# Patient Record
Sex: FEMALE | Race: WHITE | NOT HISPANIC OR LATINO | URBAN - METROPOLITAN AREA
[De-identification: names, ages, dates, MRNs, and addresses within clinical notes are randomized per-mention and may not be internally consistent; named-entity substitution may affect disease eponyms.]

---

## 2023-10-03 ENCOUNTER — OFFICE VISIT (OUTPATIENT)
Dept: UROLOGY | Facility: CLINIC | Age: 53
End: 2023-10-03
Payer: COMMERCIAL

## 2023-10-03 VITALS
HEART RATE: 71 BPM | DIASTOLIC BLOOD PRESSURE: 76 MMHG | BODY MASS INDEX: 24.01 KG/M2 | WEIGHT: 144.3 LBS | SYSTOLIC BLOOD PRESSURE: 120 MMHG

## 2023-10-03 DIAGNOSIS — N81.89 PELVIC FLOOR WEAKNESS IN FEMALE: Primary | ICD-10-CM

## 2023-10-03 DIAGNOSIS — N81.4 UTEROVAGINAL PROLAPSE: ICD-10-CM

## 2023-10-03 LAB
POC APPEARANCE, URINE: CLEAR
POC BILIRUBIN, URINE: NEGATIVE
POC BLOOD, URINE: ABNORMAL
POC COLOR, URINE: YELLOW
POC GLUCOSE, URINE: NEGATIVE MG/DL
POC KETONES, URINE: NEGATIVE MG/DL
POC LEUKOCYTES, URINE: NEGATIVE
POC NITRITE,URINE: NEGATIVE
POC PH, URINE: 7 PH
POC PROTEIN, URINE: NEGATIVE MG/DL
POC SPECIFIC GRAVITY, URINE: 1.01
POC UROBILINOGEN, URINE: 0.2 EU/DL

## 2023-10-03 PROCEDURE — 99244 OFF/OP CNSLTJ NEW/EST MOD 40: CPT | Performed by: OBSTETRICS & GYNECOLOGY

## 2023-10-03 PROCEDURE — 81003 URINALYSIS AUTO W/O SCOPE: CPT | Mod: QW | Performed by: OBSTETRICS & GYNECOLOGY

## 2023-10-03 PROCEDURE — 1036F TOBACCO NON-USER: CPT | Performed by: OBSTETRICS & GYNECOLOGY

## 2023-10-03 PROCEDURE — 87086 URINE CULTURE/COLONY COUNT: CPT | Performed by: OBSTETRICS & GYNECOLOGY

## 2023-10-03 ASSESSMENT — ENCOUNTER SYMPTOMS
DEPRESSION: 0
OCCASIONAL FEELINGS OF UNSTEADINESS: 0
LOSS OF SENSATION IN FEET: 0

## 2023-10-03 ASSESSMENT — PATIENT HEALTH QUESTIONNAIRE - PHQ9
2. FEELING DOWN, DEPRESSED OR HOPELESS: NOT AT ALL
SUM OF ALL RESPONSES TO PHQ9 QUESTIONS 1 AND 2: 0
1. LITTLE INTEREST OR PLEASURE IN DOING THINGS: NOT AT ALL

## 2023-10-03 ASSESSMENT — PAIN SCALES - GENERAL: PAINLEVEL: 0-NO PAIN

## 2023-10-03 NOTE — PROGRESS NOTES
Subjective   Patient ID: Anastasiya Wheat is a 53 y.o. female who presents for vaginal bulge  HPI    53- year-old patient presenting as a referral from Dr. Pollard  with complaints of vaginal bulge.     The patient presents with complaints of noting a vaginal bulge for the past few weeks to months. , she notes a pressure and pulling sensation with it. She is not sexually active and denies any vaginal complaints, no abnormal vaginal bleeding or discharge. She denies any vaginal dryness or irritation. Her last pap smear was in 2019.  She has been amenorrheic for the last year.    She denies any significant lower urinary tract complaints. She denies any episodes of nocturia or enuresis. She voids every hour during the day. She denies leaking on laughing, cough or sneezing. She denies any history of nephrolithiasis, gross hematuria or chronic recurrent UTIs.     She denies any bowel related complaints, no fecal or flatal incontinence.    She has no other complaints.    Review of Systems  Constitutional: No fever, No chills and No fatigue.   Eyes: No vision problems and No dryness of the eyes.   ENT: No dry mouth, No hearing loss and No nosebleeds.   Cardiovascular: No chest pain, No palpitations and No orthopnea.   Respiratory: No shortness of breath, No cough and No wheezing.   Gastrointestinal: No abdominal pain, No constipation, No nausea, No diarrhea, No vomiting and No melena.   Genitourinary: As noted in HPI.   Musculoskeletal: No back pain, No myalgias, No muscle weakness, No joint swelling and No leg edema.   Integumentary: No rashes, No skin lesion and No itching.   Neurological: No headache, No numbness and No dizziness.   Psychiatric: No sleep disturbances, No anxiety and No depression.   Endocrine: No hot flashes, No loss of hair and No hirsutism.   Hematologic/Lymphatic: No swollen glands, No tendency for easy bleeding and No tendency for easy bruising.   All other systems have been reviewed and are  negative for complaint.        Objective   Physical Exam  Constitutional: alert and in no acute distress, well developed, well nourished.   Head and Face: head and face: unremarkable.   Eyes, Ears, Nose, Mouth, and Throat: unremarkable external exam - nonicteric sclera, extraocular movements intact (EOMI) and no ptosis, unremarkable external inspection of ears and nose.   Neck: no neck asymmetry, supple, thyroid not enlarged and there were no palpable thyroid nodules.   Cardiovascular: unremarkable distal pulses.   Pulmonary: no respiratory distress, unremarkable respiratory effort., clear bilateral breath sounds.   Abdomen: soft nontender; no abdominal mass palpated, no organomegaly and no hernias.  There is no tenderness to palpation in the bilateral inguinal area.  Musculoskeletal: no joint swelling seen, normal movements of all extremities.   Skin: normal skin color and pigmentation, normal skin turgor, and no rash.   Neurologic: cranial nerves: non-focal, grossly intact.   Lymphatic: no cervical lymphadenopathy, palpation of lymph nodes in other areas: normal.   Psychiatric: alert and oriented x 3, affect normal to patient baseline, mood: appropriate and judgment and insight: intact.      Sexual Maturity: Normal  External Genitalia: No vulvar atrophy and no vulvitis  Inguinal Nodes:  No inguinal lymph adenopathy  Vestibule: No bartholin's cyst and no [place]'s gland cyst  Urethra: hypermobile, nut no ureterocele, No urethral caruncle and No tenderness.  Negative CST when sitting.   Bladder: No tenderness, No distension  Vagina: rectocele -1, cystocele -1  Cervix: no lesions   Uterus: normal size, mobile, and nontender  Adnexa: Unremarkable on palpation.  Pelvic Floor/Tenderness: KEGEL(Out of 5): 4 highest pain level is 0 (0-10)  POP-Q: Stage:  and patient was sitting. Aa.  -1 Ba: -1 C: -6 Gh:.  4 Pb:.  3 TVL: 9 Ap: -1 Bp: -1 D: -8  Rectum: Rectocele Yes -distal. The sphincter tone Normal.  Anus: There were no  anal fissures. There is no hemorrhoids.  Perianal area: Unremarkable.   Assessment/Plan   53-year-old with stage I anterior and posterior wall prolapse with pelvic floor weakness.    #1 we discussed the patient's prolapse complaints.  She denies any lower urinary tract symptoms.  She is presently not sexually active.  But she does note her bulge when showering.  We discussed the treatment algorithm including expectant management, pessary management, pelvic floor physical therapy, and surgical options.  The patient wishes to proceed with pelvic floor physical therapy and was provided a referral as well as a list of providers.    2.  Patient is otherwise well estrogenized and denies any lower urinary tract or defecatory complaints.    3.  The patient will follow-up in 8 weeks to discuss her pelvic complaints.  She does have a history of inguinal hernia repair although this is not evident on exam today.    ZACHARY Stephenson MD     Scribe Attestation  By signing my name below, IConstance Scribe attest that this documentation has been prepared under the direction and in the presence of Pete Stephenson MD. All medical record entries made by the Scribe were at my direction or personally dictated by me. I have reviewed the chart and agree that the record accurately reflects my personal performance of the history, physical exam, discussion and plan.

## 2023-10-06 LAB — BACTERIA UR CULT: NO GROWTH

## 2023-11-14 ENCOUNTER — TREATMENT (OUTPATIENT)
Dept: PHYSICAL THERAPY | Facility: CLINIC | Age: 53
End: 2023-11-14
Payer: COMMERCIAL

## 2023-11-14 DIAGNOSIS — N81.4 UTEROVAGINAL PROLAPSE: ICD-10-CM

## 2023-11-14 DIAGNOSIS — N81.89 PELVIC FLOOR WEAKNESS IN FEMALE: ICD-10-CM

## 2023-11-14 PROCEDURE — 97535 SELF CARE MNGMENT TRAINING: CPT | Mod: GP

## 2023-11-14 PROCEDURE — 97110 THERAPEUTIC EXERCISES: CPT | Mod: GP

## 2023-11-14 PROCEDURE — 97161 PT EVAL LOW COMPLEX 20 MIN: CPT | Mod: GP

## 2023-11-14 ASSESSMENT — PAIN - FUNCTIONAL ASSESSMENT: PAIN_FUNCTIONAL_ASSESSMENT: 0-10

## 2023-11-14 ASSESSMENT — ENCOUNTER SYMPTOMS
DEPRESSION: 0
LOSS OF SENSATION IN FEET: 0
OCCASIONAL FEELINGS OF UNSTEADINESS: 0

## 2023-11-14 ASSESSMENT — PAIN SCALES - GENERAL: PAINLEVEL_OUTOF10: 0 - NO PAIN

## 2023-11-14 ASSESSMENT — PATIENT HEALTH QUESTIONNAIRE - PHQ9
1. LITTLE INTEREST OR PLEASURE IN DOING THINGS: NOT AT ALL
2. FEELING DOWN, DEPRESSED OR HOPELESS: NOT AT ALL
SUM OF ALL RESPONSES TO PHQ9 QUESTIONS 1 AND 2: 0

## 2023-11-14 NOTE — PROGRESS NOTES
Physical Therapy    PELVIC FLOOR    Name: Anastasiya Wheat  MRN: [id number]  : 1970  Today's Date: 23     Time Calculation  Start Time: 1004  Stop Time: 1056  Time Calculation (min): 52 min    Assessment:     Pt presenting to the clinic with intermittent vaginal bulge and pressure that is noticeable during standing with one leg propped and long distance running. The pressure is not consistent but patient is here as a preventive measure. She is wanting information and exercises to reduce prolapse pressure and prevent worsening of symptoms. Upon exam, pt has some descent with increased abdominal pressure but appropriate mobility of her pelvic floor. She could benefit from continued PT to get on a home program for management of symptoms.     Plan:     Will follow up with 1 appointment in 2-3 weeks to review exercises and build a formal program.   PT Plan: Skilled PT  PT Frequency: Follow-up visit only  Number of Treatments Authorized: 100  Rehab Potential: Excellent  Plan of Care Agreement: Patient    Planned interventions include: biofeedback, cryotherapy, education/instruction, electrical stimulation, gait training, home program, hot pack, kinesiotaping, manual therapy, neuromuscular re-education, self care/home management, therapeutic activities, and therapeutic exercises.     Interventions:     Access Code: [email or web address]  URL: [email or web address]  Date: 2023  Prepared by: Sonal Rivero    Exercises  - Clamshell  - 1 x daily - 7 x weekly - 2 sets - 10 reps - 5 seconds  hold  - Sidelying Reverse Clamshell  - 1 x daily - 7 x weekly - 2 sets - 10 reps - 5 seconds  hold  - Bird Dog  - 1 x daily - 7 x weekly - 2 sets - 10 reps - 5 seconds  hold  - Quadruped Alternating Arm Lift  - 1 x daily - 7 x weekly - 2 sets - 10 reps - 5 seconds  hold    Current Problem:  1. Uterovaginal prolapse  Referral to Physical Therapy    Follow Up In Physical Therapy      2. Pelvic floor weakness in  female  Referral to Physical Therapy    Follow Up In Physical Therapy          Subjective   General  Reason for Referral: Uterine prolapse  Referred By: Dr. Stephenson  Stage 1-2 vaginal prolapse.   Noticeable in the shower, feels like a tampon is falling out during running (inconsistently).   bothered by the rubbing.    Running 4 days per week, 15 miles per week.       Bladder:  Frequency: not bothersome for patient, 5 times per day.   Night time frequency: 0-1 (limits water intake)  Bladder leakage only occurs with heavy duty laughing   1 daily liner     Ob Gyn:   1 vaginal birth remotely   Recently entered menopause, no topical hormones   Not currently sexually active     Bowel:  Alternates between hard and soft stool   Takes magnesium supplements  Prolapse not increase with bowel movements     Precautions  Precautions Comment: none  Vital Signs     Pain  Pain Assessment: 0-10  Pain Score: 0 - No pain    Objective     Vaginal Exam:    Observation: appropriate erythema, labia and vulva girth. No evidence of hemorrhoids.  Volitional movement: able to contract pelvic floor with full contraction and elongation    Palpation:  No tenderness to external superficial pelvic floor muscles   No tenderness around vestibule or introitus   Appropriate intravaginal mobility. Mild obtrusion from urethra and bladder.     Mild midline descent with curl up  No midline or anterior descent with coughing     Strength:  Decreased coordination noted with manual feedback, requires increased cuing for performance   Strength 4/5 with good lift but Vcs needed for coordination       OUTCOMES MEASURE:  NIH CPSI pain 6  NIH CPSI urinary 1   NIH CPSI quality of life 5    Education:   Discussed anatomy and physiology of prolapse, menoapusal influences, and bladder performance     Careplan Goals:    Pt will be independent in HEP to maximize PT POC   Pt will improve NIH CPSI by >50% raw score  Pt will continue to be pain free for duration of PT POC    Pt will reduce frequency of prolapse pressure/episodes by >50%       Agustina Rivero, PT

## 2023-11-27 ENCOUNTER — APPOINTMENT (OUTPATIENT)
Dept: PHYSICAL THERAPY | Facility: CLINIC | Age: 53
End: 2023-11-27
Payer: COMMERCIAL

## 2023-11-28 ENCOUNTER — APPOINTMENT (OUTPATIENT)
Dept: UROLOGY | Facility: CLINIC | Age: 53
End: 2023-11-28
Payer: COMMERCIAL

## 2023-12-12 ENCOUNTER — APPOINTMENT (OUTPATIENT)
Dept: PHYSICAL THERAPY | Facility: CLINIC | Age: 53
End: 2023-12-12
Payer: COMMERCIAL

## 2023-12-26 PROBLEM — R10.10 PAIN OF UPPER ABDOMEN: Status: ACTIVE | Noted: 2023-12-26

## 2023-12-26 PROBLEM — R13.19 ESOPHAGEAL DYSPHAGIA: Status: ACTIVE | Noted: 2023-12-26

## 2023-12-26 PROBLEM — H61.21 IMPACTED CERUMEN OF RIGHT EAR: Status: ACTIVE | Noted: 2021-01-21

## 2023-12-26 PROBLEM — K21.9 GERD (GASTROESOPHAGEAL REFLUX DISEASE): Status: ACTIVE | Noted: 2023-12-26

## 2023-12-26 PROBLEM — J30.2 SEASONAL ALLERGIC RHINITIS: Status: ACTIVE | Noted: 2021-01-21

## 2023-12-26 PROBLEM — R07.9 CHEST PAIN: Status: ACTIVE | Noted: 2021-05-12

## 2023-12-26 PROBLEM — J32.0 CHRONIC MAXILLARY SINUSITIS: Status: ACTIVE | Noted: 2021-01-21

## 2023-12-26 PROBLEM — J32.2 CHRONIC ETHMOIDAL SINUSITIS: Status: ACTIVE | Noted: 2021-01-21

## 2023-12-26 PROBLEM — G47.00 INSOMNIA: Status: ACTIVE | Noted: 2017-10-31

## 2023-12-26 PROBLEM — J32.1 CHRONIC FRONTAL SINUSITIS: Status: ACTIVE | Noted: 2021-01-21

## 2023-12-26 PROBLEM — R10.13 DYSPEPSIA: Status: ACTIVE | Noted: 2023-12-26

## 2023-12-26 PROBLEM — K41.90 FEMORAL HERNIA: Status: ACTIVE | Noted: 2019-06-04

## 2023-12-26 PROBLEM — K25.9 GASTRIC ULCER: Status: ACTIVE | Noted: 2023-12-26

## 2023-12-26 RX ORDER — LOSARTAN POTASSIUM 50 MG/1
50 TABLET ORAL
COMMUNITY
Start: 2023-06-21

## 2023-12-26 RX ORDER — AZELASTINE 1 MG/ML
2 SPRAY, METERED NASAL 2 TIMES DAILY
COMMUNITY

## 2023-12-26 RX ORDER — ALBUTEROL SULFATE 90 UG/1
AEROSOL, METERED RESPIRATORY (INHALATION)
COMMUNITY
End: 2024-01-04 | Stop reason: WASHOUT

## 2023-12-26 RX ORDER — ATORVASTATIN CALCIUM 40 MG/1
TABLET, FILM COATED ORAL
COMMUNITY
End: 2024-01-04 | Stop reason: WASHOUT

## 2023-12-26 RX ORDER — DOXYCYCLINE 100 MG/1
CAPSULE ORAL
COMMUNITY

## 2023-12-26 RX ORDER — LOSARTAN POTASSIUM 25 MG/1
1 TABLET ORAL DAILY
COMMUNITY
End: 2024-01-04 | Stop reason: WASHOUT

## 2023-12-26 RX ORDER — BENZONATATE 100 MG/1
CAPSULE ORAL
COMMUNITY

## 2023-12-26 RX ORDER — FLUTICASONE PROPIONATE 50 MCG
2 SPRAY, SUSPENSION (ML) NASAL DAILY
COMMUNITY

## 2023-12-26 RX ORDER — AMOXICILLIN AND CLAVULANATE POTASSIUM 875; 125 MG/1; MG/1
1 TABLET, FILM COATED ORAL 2 TIMES DAILY
COMMUNITY
End: 2024-01-04 | Stop reason: WASHOUT

## 2023-12-26 RX ORDER — TRAZODONE HYDROCHLORIDE 100 MG/1
1 TABLET ORAL NIGHTLY
COMMUNITY
Start: 2023-06-21

## 2023-12-26 RX ORDER — IBUPROFEN 600 MG/1
TABLET ORAL
COMMUNITY

## 2023-12-26 RX ORDER — NAPROXEN 500 MG/1
TABLET ORAL
COMMUNITY

## 2023-12-26 RX ORDER — PANTOPRAZOLE SODIUM 40 MG/1
TABLET, DELAYED RELEASE ORAL
COMMUNITY

## 2024-01-02 ENCOUNTER — OFFICE VISIT (OUTPATIENT)
Dept: UROLOGY | Facility: CLINIC | Age: 54
End: 2024-01-02
Payer: COMMERCIAL

## 2024-01-02 VITALS
HEART RATE: 65 BPM | BODY MASS INDEX: 23.9 KG/M2 | WEIGHT: 143.6 LBS | DIASTOLIC BLOOD PRESSURE: 70 MMHG | SYSTOLIC BLOOD PRESSURE: 110 MMHG

## 2024-01-02 DIAGNOSIS — N81.4 UTEROVAGINAL PROLAPSE: ICD-10-CM

## 2024-01-02 DIAGNOSIS — N81.89 PELVIC FLOOR WEAKNESS IN FEMALE: Primary | ICD-10-CM

## 2024-01-02 PROCEDURE — 99213 OFFICE O/P EST LOW 20 MIN: CPT | Performed by: OBSTETRICS & GYNECOLOGY

## 2024-01-02 PROCEDURE — 1036F TOBACCO NON-USER: CPT | Performed by: OBSTETRICS & GYNECOLOGY

## 2024-01-02 NOTE — PROGRESS NOTES
Subjective   Patient ID: Anastasiya Wheat is a 53 y.o. female who presents for vaginal bulge  HPI  53-year-old with stage I anterior and posterior wall prolapse with pelvic floor weakness.    The patient did not follow up with pelvic floor physical therapy as she did not feel the need, she is able to continue with her daily activities without any bothersome bulge complaints. She denies any vaginal complaints, no abnormal bleeding or discharge.      She denies any significant lower urinary tract complaints.    She denies any bowel related complaints, no fecal or flatal incontinence.    She has no other complaints.    From Previous note  53- year-old patient presenting as a referral from Dr. Pollard  with complaints of vaginal bulge.     The patient presents with complaints of noting a vaginal bulge for the past few weeks to months. , she notes a pressure and pulling sensation with it. She is not sexually active and denies any vaginal complaints, no abnormal vaginal bleeding or discharge. She denies any vaginal dryness or irritation. Her last pap smear was in 2019.  She has been amenorrheic for the last year.    She denies any significant lower urinary tract complaints. She denies any episodes of nocturia or enuresis. She voids every hour during the day. She denies leaking on laughing, cough or sneezing. She denies any history of nephrolithiasis, gross hematuria or chronic recurrent UTIs.     She denies any bowel related complaints, no fecal or flatal incontinence.    She has no other complaints.    Review of Systems  Constitutional: No fever, No chills and No fatigue.   Eyes: No vision problems and No dryness of the eyes.   ENT: No dry mouth, No hearing loss and No nosebleeds.   Cardiovascular: No chest pain, No palpitations and No orthopnea.   Respiratory: No shortness of breath, No cough and No wheezing.   Gastrointestinal: No abdominal pain, No constipation, No nausea, No diarrhea, No vomiting and No melena.    Genitourinary: As noted in HPI.   Musculoskeletal: No back pain, No myalgias, No muscle weakness, No joint swelling and No leg edema.   Integumentary: No rashes, No skin lesion and No itching.   Neurological: No headache, No numbness and No dizziness.   Psychiatric: No sleep disturbances, No anxiety and No depression.   Endocrine: No hot flashes, No loss of hair and No hirsutism.   Hematologic/Lymphatic: No swollen glands, No tendency for easy bleeding and No tendency for easy bruising.   All other systems have been reviewed and are negative for complaint.        Objective   Physical Exam  Constitutional: alert and in no acute distress, well developed, well nourished.   Head and Face: head and face: unremarkable.   Eyes, Ears, Nose, Mouth, and Throat: unremarkable external exam - nonicteric sclera, extraocular movements intact (EOMI) and no ptosis, unremarkable external inspection of ears and nose.   Neck: no neck asymmetry, supple, thyroid not enlarged and there were no palpable thyroid nodules.   Cardiovascular: unremarkable distal pulses.   Pulmonary: no respiratory distress, unremarkable respiratory effort., clear bilateral breath sounds.   Abdomen: soft nontender; no abdominal mass palpated, no organomegaly and no hernias.  There is no tenderness to palpation in the bilateral inguinal area.  Musculoskeletal: no joint swelling seen, normal movements of all extremities.   Skin: normal skin color and pigmentation, normal skin turgor, and no rash.   Neurologic: cranial nerves: non-focal, grossly intact.   Lymphatic: no cervical lymphadenopathy, palpation of lymph nodes in other areas: normal.   Psychiatric: alert and oriented x 3, affect normal to patient baseline, mood: appropriate and judgment and insight: intact.      Assessment/Plan   53-year-old with stage I anterior and posterior wall prolapse with pelvic floor weakness.    #1 we again discussed the patient's prolapse complaints.  She denies any lower  urinary tract symptoms.  She is presently not sexually active.  But she does note her bulge when showering.  She has proceeded with pelvic floor physical therapy and is doing this intermittently at home.  She is overall very reassured with her present status and does not desire any further therapy at this time.  We have previously discussed the treatment algorithm including expectant management, pessary management, pelvic floor physical therapy, and surgical options.     2.  Patient is otherwise well estrogenized and denies any lower urinary tract or defecatory complaints.    3.  The patient will follow-up on an as-needed basis moving forward    ZACHARY Stephenson MD     Scribe Attestation  By signing my name below, I, Sonia Macario attest that this documentation has been prepared under the direction and in the presence of Pete Stephenson MD. All medical record entries made by the Scribe were at my direction or personally dictated by me. I have reviewed the chart and agree that the record accurately reflects my personal performance of the history, physical exam, discussion and plan.

## 2024-01-04 PROBLEM — N81.4 UTEROVAGINAL PROLAPSE: Status: ACTIVE | Noted: 2024-01-04

## 2024-01-04 RX ORDER — METHYLPREDNISOLONE 4 MG/1
4 TABLET ORAL
COMMUNITY
Start: 2023-12-14

## 2024-02-05 NOTE — PROGRESS NOTES
Physical Therapy    Discharge Summary    Name: Anastasiya Wheat  MRN: [id number]  : 1970  Date: 24    Discharge Summary: PT    Discharge Information: Date of discharge 24, Date of last visit 23, Date of evaluation 23, Number of attended visits 1, Referred by Dr. Stephenson, and Referred for prolapse symptoms     Discharge Status: unknown     Rehab Discharge Reason: Failed to schedule and/or keep follow-up appointment(s)